# Patient Record
Sex: MALE
[De-identification: names, ages, dates, MRNs, and addresses within clinical notes are randomized per-mention and may not be internally consistent; named-entity substitution may affect disease eponyms.]

---

## 2024-01-16 PROBLEM — Z00.00 ENCOUNTER FOR PREVENTIVE HEALTH EXAMINATION: Status: ACTIVE | Noted: 2024-01-16

## 2024-01-22 ENCOUNTER — APPOINTMENT (OUTPATIENT)
Dept: ORTHOPEDIC SURGERY | Facility: CLINIC | Age: 59
End: 2024-01-22
Payer: MEDICAID

## 2024-01-22 VITALS — BODY MASS INDEX: 28.86 KG/M2 | HEIGHT: 60 IN | WEIGHT: 147 LBS

## 2024-01-22 DIAGNOSIS — G89.29 PAIN IN RIGHT HIP: ICD-10-CM

## 2024-01-22 DIAGNOSIS — Z78.9 OTHER SPECIFIED HEALTH STATUS: ICD-10-CM

## 2024-01-22 DIAGNOSIS — M25.551 PAIN IN RIGHT HIP: ICD-10-CM

## 2024-01-22 DIAGNOSIS — Z87.891 PERSONAL HISTORY OF NICOTINE DEPENDENCE: ICD-10-CM

## 2024-01-22 PROCEDURE — 99203 OFFICE O/P NEW LOW 30 MIN: CPT | Mod: 25

## 2024-01-22 PROCEDURE — 20610 DRAIN/INJ JOINT/BURSA W/O US: CPT | Mod: RT

## 2024-01-22 NOTE — PHYSICAL EXAM
[Right] : right hip with pelvis [Outside films reviewed] : Outside films reviewed [There are no fractures, subluxations or dislocations. No significant abnormalities are seen] : There are no fractures, subluxations or dislocations. No significant abnormalities are seen [de-identified] : Constitutional: The patient appears well developed, well nourished. Examination of patients ability to communicate functionally was normal.       Neurologic: Coordination is normal. Alert and oriented to time, place and person. No evidence of mood disorder, calm affect.         RIGHT    HIP/THIGH : Inspection of the hip/thigh is as follows: Inspection shows no swelling, no ecchymosis, no erythema, no rashes and no masses.       Palpation of the hip/thigh is as follows: NO  tenderness. no palpable defects and no palpable masses.       Range of motion of the hip is as follows in degrees:       Flexion: 100     Abduction:  20     External rotation:  40    Internal rotaion:  25      Stength testing of the hip/thigh is as follows:   Hip flexion strength:   5/5    Hip extension strength:  5/5    Hip abductionstrength:  5/5   Hip adductionstrength:  5/5      Neurological testing of the hip/thigh is as follows: motor exam 5/5 throughout, light touch intact throughout and no focal motor defecits.       Gait and function is as follows: mildly antalgic gait.

## 2024-01-22 NOTE — HISTORY OF PRESENT ILLNESS
[de-identified] : Patient is here today for the right hip. Patient had x-rays of the hip at .   Patient c/o Right groin pain since approx July '23.  He has hx of low back surgery approx 3 yrs ago.

## 2024-01-22 NOTE — DISCUSSION/SUMMARY
[de-identified] : Extensive discussion of the options was had with the patient. This discussion included both surgical and nonsurgical options. Options including but not limited to cortisone injection, viscosupplementation, physical therapy, oral anti-inflammatories, MRI, arthroplasty and arthroscopy were discussed with the patient. We also discussed the option of observation, allowing the patient to continue rest, ice, NSAIDs and following up if the condition does not improve. Time was taken to go over any questions the patient had in regards to any of the treatment plans described.  He c/o numbness down the RLE. Plan is for CSI for the right greater troch and he will see Dr. Wilson for eval of the lumbar spine.   The following information has been documented by An Urrutia acting as a scribe. Dr. Weinberg Attestation The documentation recorded by the scribe, in my presence, accurately reflects the service I, Dr. Weinberg, personally performed, and the decisions made by me with my edits as appropriate.

## 2024-01-25 ENCOUNTER — APPOINTMENT (OUTPATIENT)
Dept: PULMONOLOGY | Facility: CLINIC | Age: 59
End: 2024-01-25
Payer: MEDICAID

## 2024-01-25 VITALS
SYSTOLIC BLOOD PRESSURE: 108 MMHG | HEART RATE: 87 BPM | DIASTOLIC BLOOD PRESSURE: 62 MMHG | BODY MASS INDEX: 28.86 KG/M2 | OXYGEN SATURATION: 96 % | TEMPERATURE: 98.4 F | HEIGHT: 60 IN | WEIGHT: 147 LBS

## 2024-01-25 DIAGNOSIS — R10.9 UNSPECIFIED ABDOMINAL PAIN: ICD-10-CM

## 2024-01-25 PROCEDURE — 94010 BREATHING CAPACITY TEST: CPT

## 2024-01-25 PROCEDURE — 94727 GAS DIL/WSHOT DETER LNG VOL: CPT

## 2024-01-25 PROCEDURE — 99204 OFFICE O/P NEW MOD 45 MIN: CPT | Mod: 25

## 2024-01-25 PROCEDURE — 94729 DIFFUSING CAPACITY: CPT

## 2024-02-02 ENCOUNTER — NON-APPOINTMENT (OUTPATIENT)
Age: 59
End: 2024-02-02

## 2024-02-05 ENCOUNTER — NON-APPOINTMENT (OUTPATIENT)
Age: 59
End: 2024-02-05

## 2024-02-08 ENCOUNTER — APPOINTMENT (OUTPATIENT)
Dept: PULMONOLOGY | Facility: CLINIC | Age: 59
End: 2024-02-08
Payer: MEDICAID

## 2024-02-08 VITALS
SYSTOLIC BLOOD PRESSURE: 118 MMHG | OXYGEN SATURATION: 98 % | BODY MASS INDEX: 28.86 KG/M2 | HEART RATE: 87 BPM | DIASTOLIC BLOOD PRESSURE: 70 MMHG | TEMPERATURE: 98 F | WEIGHT: 147 LBS | HEIGHT: 60 IN

## 2024-02-08 PROCEDURE — 99215 OFFICE O/P EST HI 40 MIN: CPT

## 2024-02-08 NOTE — PROCEDURE
[FreeTextEntry1] : Echocardiogram 2/1/2024 normal study. Doppler evidence grade 1 diastolic and function cannot rule out subtle regional wall motion abnormalities consider study with echo enhancing agent. All else read as normal.  CAT scan abdomen pelvis 2/1/2024 slight linear scarring right middle lobe and abdominal study no abnormalities. Fat-containing right inguinal hernia. Mild splenomegaly.  Enlarged prostate

## 2024-02-08 NOTE — HISTORY OF PRESENT ILLNESS
[Former] : former [Never] : never [TextBox_4] : 58 male co  dyspnea feels has to take deep breath when talking and feels tired dyspnea on exertion  no chest pain no tightness no wheeze no cough no phlegm occ  dyspnea at rest  no nite awakening [TextBox_11] : 0.5 [TextBox_13] : 20 [YearQuit] : 2021

## 2024-02-08 NOTE — REASON FOR VISIT
[Follow-Up] : a follow-up visit [Shortness of Breath] : shortness of breath [TextBox_44] : 2 weeks. Pt feels that his symptoms are the same, still experiences SOB and feels Bloated. Pt states he had a CT and labs done in the beginning of the February.

## 2024-02-08 NOTE — PHYSICAL EXAM
[No Acute Distress] : no acute distress [Normal Oropharynx] : normal oropharynx [Normal Appearance] : normal appearance [No Neck Mass] : no neck mass [Normal Rate/Rhythm] : normal rate/rhythm [Normal S1, S2] : normal s1, s2 [No Murmurs] : no murmurs [No Resp Distress] : no resp distress [Clear to Auscultation Bilaterally] : clear to auscultation bilaterally [No Abnormalities] : no abnormalities [Benign] : benign [Normal Gait] : normal gait [No Clubbing] : no clubbing [No Cyanosis] : no cyanosis [No Edema] : no edema [FROM] : FROM [Normal Color/ Pigmentation] : normal color/ pigmentation [No Focal Deficits] : no focal deficits [Oriented x3] : oriented x3 [Normal Affect] : normal affect [TextBox_2] : little people presentation

## 2024-02-08 NOTE — DISCUSSION/SUMMARY
[FreeTextEntry1] : Mr. Scott has very nonspecific symptoms of shortness of breath.  Is mainly abdominal in nature.  His abdominal CT shows a right inguinal hernia and enlarged prostate.  I do not think these are contributing to his current symptomatology.  I have asked him to go to [place] for cardiopulmonary exercise test to see if there is any definite etiology for the shortness of breath.  I did ask him to follow-up with Dr. Franco regarding the enlarged prostate and the PSA test and then a decision on the hernia can be made as well. The patient understands and agrees with the plan of care. Today's office visit encompassed 42 minutes. I conducted an extensive history, physical exam and reviewed diagnosis and treatment options including diagnostic tests radiology studies including cat scans and the use of prescription medication.

## 2024-02-09 ENCOUNTER — APPOINTMENT (OUTPATIENT)
Dept: ORTHOPEDIC SURGERY | Facility: CLINIC | Age: 59
End: 2024-02-09
Payer: MEDICAID

## 2024-02-09 DIAGNOSIS — Q65.89 OTHER SPECIFIED CONGENITAL DEFORMITIES OF HIP: ICD-10-CM

## 2024-02-09 DIAGNOSIS — Z86.39 PERSONAL HISTORY OF OTHER ENDOCRINE, NUTRITIONAL AND METABOLIC DISEASE: ICD-10-CM

## 2024-02-09 DIAGNOSIS — Z98.1 ARTHRODESIS STATUS: ICD-10-CM

## 2024-02-09 PROCEDURE — 99214 OFFICE O/P EST MOD 30 MIN: CPT

## 2024-02-09 NOTE — ASSESSMENT
[FreeTextEntry1] : CT Abdomen and Pelvis: Bilateral hip dysplasia Laminectomy L2-5 Fusion L3-4  57 yo male presents today for pain primarily in his right hip. CT from  shows bilateral hip dysplasia. Discussed with patient that he may benefit from CSI with pain management. However, referral to Dr. Rhaman at [facility] if no improvement in back/hip pain.   - Referral to pain management for bilateral hip CSI, follow up 2 weeks after injection.   - Recommend physical therapy to regain range of motion, strengthening and symptomatic improvement. Prescription given in office today.   - Recommend NSAIDs PRN  - Recommend rest, ice, compression, elevation - Recommend activity modification, avoid strenuous or high impact activities  Patient was educated on their diagnosis today. Emphasized the importance of gentle stretching and strengthening. Patient expressed understanding and all questions were answered today.    Follow up PRN

## 2024-02-09 NOTE — HISTORY OF PRESENT ILLNESS
[de-identified] : Patient presents today with lower back/ right hip pain ongoing for years  Pt reports pain in right side of back/ buttock radiating into hip/ groin and down right leg into the knee  Pt states he was evaluated for hip by [facility] and was referred for a back eval- pt is insistent that his issue is his hip and not his back.  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

## 2024-02-09 NOTE — PHYSICAL EXAM
[Bilateral] : hip bilaterally [] : tenderness [de-identified] : external rotation 30 degrees [TWNoteComboBox6] : internal rotation 20 degrees

## 2024-02-09 NOTE — DATA REVIEWED
[CT Scan] : CT scan [I independently reviewed and interpreted images and report] : I independently reviewed and interpreted images and report [FreeTextEntry1] : CT Abdomen and Pelvis: Bilateral hip dysplasia Laminectomy L2-5 Fusion L3-4

## 2024-04-16 ENCOUNTER — NON-APPOINTMENT (OUTPATIENT)
Age: 59
End: 2024-04-16

## 2024-04-19 ENCOUNTER — APPOINTMENT (OUTPATIENT)
Dept: PULMONOLOGY | Facility: CLINIC | Age: 59
End: 2024-04-19
Payer: MEDICAID

## 2024-04-19 VITALS
BODY MASS INDEX: 26.7 KG/M2 | HEART RATE: 94 BPM | OXYGEN SATURATION: 96 % | SYSTOLIC BLOOD PRESSURE: 132 MMHG | TEMPERATURE: 98.3 F | HEIGHT: 60 IN | DIASTOLIC BLOOD PRESSURE: 68 MMHG | WEIGHT: 136 LBS

## 2024-04-19 DIAGNOSIS — R06.00 DYSPNEA, UNSPECIFIED: ICD-10-CM

## 2024-04-19 PROCEDURE — 99214 OFFICE O/P EST MOD 30 MIN: CPT

## 2024-04-19 RX ORDER — GABAPENTIN 100 MG
100 TABLET ORAL
Refills: 0 | Status: ACTIVE | COMMUNITY

## 2024-04-19 RX ORDER — ELECTROLYTES/DEXTROSE
SOLUTION, ORAL ORAL
Refills: 0 | Status: DISCONTINUED | COMMUNITY
End: 2024-04-19

## 2024-04-19 RX ORDER — ALBUTEROL SULFATE 90 UG/1
108 (90 BASE) INHALANT RESPIRATORY (INHALATION) EVERY 4 HOURS
Qty: 1 | Refills: 6 | Status: ACTIVE | COMMUNITY
Start: 2024-04-19 | End: 1900-01-01

## 2024-07-12 ENCOUNTER — APPOINTMENT (OUTPATIENT)
Dept: PULMONOLOGY | Facility: CLINIC | Age: 59
End: 2024-07-12